# Patient Record
Sex: MALE | Race: WHITE | Employment: UNEMPLOYED | URBAN - METROPOLITAN AREA
[De-identification: names, ages, dates, MRNs, and addresses within clinical notes are randomized per-mention and may not be internally consistent; named-entity substitution may affect disease eponyms.]

---
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
## 2019-03-19 RX ORDER — PERMETHRIN 50 MG/G
CREAM TOPICAL ONCE
Qty: 60 G | Refills: 0 | Status: SHIPPED | OUTPATIENT
Start: 2019-03-19 | End: 2019-03-19

## 2019-05-23 ENCOUNTER — OFFICE VISIT (OUTPATIENT)
Dept: PRIMARY CARE CLINIC | Age: 19
End: 2019-05-23
Payer: COMMERCIAL

## 2019-05-23 VITALS
SYSTOLIC BLOOD PRESSURE: 108 MMHG | BODY MASS INDEX: 24.88 KG/M2 | DIASTOLIC BLOOD PRESSURE: 70 MMHG | HEART RATE: 57 BPM | OXYGEN SATURATION: 98 % | TEMPERATURE: 98.2 F | HEIGHT: 69 IN | WEIGHT: 168 LBS

## 2019-05-23 DIAGNOSIS — Z00.00 ANNUAL PHYSICAL EXAM: Primary | ICD-10-CM

## 2019-05-23 PROCEDURE — 99395 PREV VISIT EST AGE 18-39: CPT | Performed by: FAMILY MEDICINE

## 2019-05-23 ASSESSMENT — PATIENT HEALTH QUESTIONNAIRE - PHQ9
SUM OF ALL RESPONSES TO PHQ QUESTIONS 1-9: 0
1. LITTLE INTEREST OR PLEASURE IN DOING THINGS: 0
2. FEELING DOWN, DEPRESSED OR HOPELESS: 0
SUM OF ALL RESPONSES TO PHQ QUESTIONS 1-9: 0
SUM OF ALL RESPONSES TO PHQ9 QUESTIONS 1 & 2: 0

## 2019-05-23 NOTE — PROGRESS NOTES
SUBJECTIVE:    HPI: Bernadine Franco  Was seen here today for   Chief Complaint   Patient presents with    Well Child    . He states they are dealing with no new issues. Going to [employer] for college for study abroad. Past Medical History:   Diagnosis Date    Sinusitis, acute ethmoidal 11/2014       Past Surgical History:   Procedure Laterality Date    ARM SURGERY Right 09/19/2013    [facility]   Dr Michael Grant       No family history on file. Prior to Admission medications    Not on File       Patient has no known allergies.     Social History     Tobacco Use    Smoking status: Never Smoker    Smokeless tobacco: Never Used   Substance Use Topics    Alcohol use: Not on file         Review Of Systems:    Skin: no abnormal pigmentation, rash, scaling, itching, masses, hair or nail changes  Eyes: no blurring, diplopia, or eye pain  Ears/Nose/Throat: no hearing loss, tinnitus, vertigo, nosebleed, nasal congestion, rhinorrhea, sore throat  Respiratory: no cough, pleuritic chest pain, dyspnea, or wheezing  Cardiovascular: no chest pain, angina, dyspnea on exertion, orthopnea, PND, palpitations, or claudication  Gastrointestinal: no nausea, vomiting, heartburn, diarrhea, constipation, bloating,  abdominal pain, or blood per rectum  Genitourinary: no urinary urgency, frequency, dysuria, nocturia, hesitancy, or incontinence  Musculoskeletal: no arthritis, arthralgia, myalgia, weakness, or morning stiffness  Neurologic: no paralysis, paresis, paresthesia, seizures, tremors, or headaches  Hematologic/Lymphatic/Immunologic: no anemia, abnormal bleeding/bruising, fever, chills, night sweats, swollen glands, or unexplained weight loss  Endocrine: no heat or cold intolerance and no polyphagia, polydipsia, or polyuria      OBJECTIVE:    VS: /70   Pulse 57   Temp 98.2 °F (36.8 °C)   Ht 5' 9\" (1.753 m)   Wt 168 lb (76.2 kg)   SpO2 98%   BMI 24.81 kg/m²   General appearance: Alert, Awake, Oriented times 3, no

## 2019-05-23 NOTE — PATIENT INSTRUCTIONS
Patient Education        Advance Care Planning: Care Instructions  Your Care Instructions    It can be hard to live with an illness that cannot be cured. But if your health is getting worse, you may want to make decisions about end-of-life care. Planning for the end of your life does not mean that you are giving up. It is a way to make sure that your wishes are met. Clearly stating your wishes can make it easier for your loved ones. Making plans while you are still able may also ease your mind and make your final days less stressful and more meaningful. Follow-up care is a key part of your treatment and safety. Be sure to make and go to all appointments, and call your doctor if you are having problems. It's also a good idea to know your test results and keep a list of the medicines you take. What can you do to plan for the end of life? · You can bring these issues up with your doctor. You do not need to wait until your doctor starts the conversation. You might start with \"I would not be willing to live with . Bulgarian Justice Bulgarian Justice Bulgarian Justice \" When you complete this sentence it helps your doctor understand your wishes. · Talk openly and honestly with your doctor. This is the best way to understand the decisions you will need to make as your health changes. Know that you can always change your mind. · Ask your doctor about commonly used life-support measures. These include tube feedings, breathing machines, and fluids given through a vein (IV). Understanding these treatments will help you decide whether you want them. · You may choose to have these life-supporting treatments for a limited time. This allows a trial period to see whether they will help you. You may also decide that you want your doctor to take only certain measures to keep you alive. It is important to spell out these conditions so that your doctor and family understand them. · Talk to your doctor about how long you are likely to live.  He or she may be able to give you an idea of what usually happens with your specific illness. · Think about preparing papers that state your wishes. This way there will not be any confusion about what you want. You can change your instructions at any time. Which papers should you prepare? Advance directives are legal papers that tell doctors how you want to be cared for at the end of your life. You do not need a  to write these papers. Ask your doctor or your state health department for information on how to write your advance directives. They may have the forms for each of these types of papers. Make sure your doctor has a copy of these on file, and give a copy to a family member or close friend. · Consider a do-not-resuscitate order (DNR). This order asks that no extra treatments be done if your heart stops or you stop breathing. Extra treatments may include cardiopulmonary resuscitation (CPR), electrical shock to restart your heart, or a machine to breathe for you. If you decide to have a DNR order, ask your doctor to explain and write it. Place the order in your home where everyone can easily see it. · Consider a living will. A living will explains your wishes about life support and other treatments at the end of your life if you become unable to speak for yourself. Living evans tell doctors to use or not use treatments that would keep you alive. You must have one or two witnesses or a notary present when you sign this form. · Consider a durable power of  for health care. This allows you to name a person to make decisions about your care if you are not able to. Most people ask a close friend or family member. Talk to this person about the kinds of treatments you want and those that you do not want. Make sure this person understands your wishes. These legal papers are simple to change. Tell your doctor what you want to change, and ask him or her to make a note in your medical file.  Give your family updated copies of the papers. Where can you learn more? Go to [email or web address] and sign in to your [employer] account. Enter P184 in the [employer] box to learn more about \"Advance Care Planning: Care Instructions. \"     If you do not have an account, please click on the \"Sign Up Now\" link. Current as of: April 18, 2018  Content Version: 12.0  © 2181-4847 [employer], [employer]. Care instructions adapted under license by [facility] [place] ([facility]). If you have questions about a medical condition or this instruction, always ask your healthcare professional. [address] any warranty or liability for your use of this information.

## 2020-01-03 ENCOUNTER — OFFICE VISIT (OUTPATIENT)
Dept: PRIMARY CARE CLINIC | Age: 20
End: 2020-01-03
Payer: COMMERCIAL

## 2020-01-03 VITALS
DIASTOLIC BLOOD PRESSURE: 76 MMHG | BODY MASS INDEX: 25.03 KG/M2 | WEIGHT: 169 LBS | TEMPERATURE: 97.7 F | OXYGEN SATURATION: 99 % | HEIGHT: 69 IN | SYSTOLIC BLOOD PRESSURE: 118 MMHG | HEART RATE: 63 BPM

## 2020-01-03 PROCEDURE — G8420 CALC BMI NORM PARAMETERS: HCPCS | Performed by: FAMILY MEDICINE

## 2020-01-03 PROCEDURE — 99213 OFFICE O/P EST LOW 20 MIN: CPT | Performed by: FAMILY MEDICINE

## 2020-01-03 PROCEDURE — 1036F TOBACCO NON-USER: CPT | Performed by: FAMILY MEDICINE

## 2020-01-03 PROCEDURE — G8484 FLU IMMUNIZE NO ADMIN: HCPCS | Performed by: FAMILY MEDICINE

## 2020-01-03 PROCEDURE — G8427 DOCREV CUR MEDS BY ELIG CLIN: HCPCS | Performed by: FAMILY MEDICINE

## 2020-01-03 RX ORDER — CLINDAMYCIN PHOSPHATE AND BENZOYL PEROXIDE 10; 37.5 MG/G; MG/G
GEL TOPICAL
COMMUNITY
Start: 2019-11-13

## 2020-01-03 RX ORDER — ADAPALENE 3 MG/G
GEL TOPICAL
COMMUNITY
Start: 2019-11-13

## 2020-01-03 RX ORDER — MINOCYCLINE HYDROCHLORIDE 100 MG/1
CAPSULE ORAL
COMMUNITY
Start: 2020-01-02 | End: 2021-08-02 | Stop reason: ALTCHOICE

## 2020-01-03 ASSESSMENT — PATIENT HEALTH QUESTIONNAIRE - PHQ9
SUM OF ALL RESPONSES TO PHQ9 QUESTIONS 1 & 2: 0
1. LITTLE INTEREST OR PLEASURE IN DOING THINGS: 0
SUM OF ALL RESPONSES TO PHQ QUESTIONS 1-9: 0
SUM OF ALL RESPONSES TO PHQ QUESTIONS 1-9: 0
2. FEELING DOWN, DEPRESSED OR HOPELESS: 0

## 2020-01-03 NOTE — PROGRESS NOTES
James Anthony, a male of 20 y.o. came to the office 1/3/2020. Patient Active Problem List   Diagnosis    Sinusitis, acute ethmoidal          Hand Pain    The incident occurred more than 1 week ago. The incident occurred at the gym. Injury mechanism: lifting wts and doing a snatch motion felt pain go up his thumb into wrist. The pain is present in the left wrist. The pain radiates to the left arm. The pain has been improving since the incident. Pertinent negatives include no muscle weakness, numbness or tingling. The symptoms are aggravated by lifting. He has tried ice, rest, heat and NSAIDs for the symptoms. The treatment provided mild relief. Allergies   Allergen Reactions    Cefdinir     Penicillin G        Current Outpatient Medications on File Prior to Visit   Medication Sig Dispense Refill    Adapalene 0.3 % GEL       minocycline (MINOCIN;DYNACIN) 100 MG capsule       ONEXTON 1.2-3.75 % GEL        No current facility-administered medications on file prior to visit. Review of Systems   Neurological: Negative for tingling and numbness. other review of systems reviewed and are negative    OBJECTIVE:  /76   Pulse 63   Temp 97.7 °F (36.5 °C)   Ht 5' 9\" (1.753 m)   Wt 169 lb (76.7 kg)   SpO2 99%   BMI 24.96 kg/m²      Physical Exam  Left Hand Exam     Tenderness   The patient is experiencing no tenderness. Range of Motion   Wrist   Extension: normal   Flexion: normal   Pronation: normal   Supination: normal     Muscle Strength   The patient has normal left wrist strength. :  5/5     Tests   Finkelstein's test: negative    Other   Erythema: absent            ASSESSMENT AND PLAN:    Maria Esther Aguirre was seen today for hand pain. Diagnoses and all orders for this visit:    Strain of left wrist, initial encounter    - advil prn pain  - HO wrist exercises   - be careful with lifting. Return if symptoms worsen or fail to improve.     Bob Conte, DO

## 2020-03-05 ENCOUNTER — OFFICE VISIT (OUTPATIENT)
Dept: FAMILY MEDICINE CLINIC | Age: 20
End: 2020-03-05
Payer: COMMERCIAL

## 2020-03-05 VITALS
DIASTOLIC BLOOD PRESSURE: 80 MMHG | OXYGEN SATURATION: 98 % | HEIGHT: 69 IN | SYSTOLIC BLOOD PRESSURE: 118 MMHG | BODY MASS INDEX: 23.99 KG/M2 | HEART RATE: 72 BPM | TEMPERATURE: 97.9 F | RESPIRATION RATE: 18 BRPM | WEIGHT: 162 LBS

## 2020-03-05 PROCEDURE — 99213 OFFICE O/P EST LOW 20 MIN: CPT | Performed by: PHYSICIAN ASSISTANT

## 2020-03-05 PROCEDURE — G8420 CALC BMI NORM PARAMETERS: HCPCS | Performed by: PHYSICIAN ASSISTANT

## 2020-03-05 PROCEDURE — G8427 DOCREV CUR MEDS BY ELIG CLIN: HCPCS | Performed by: PHYSICIAN ASSISTANT

## 2020-03-05 PROCEDURE — 1036F TOBACCO NON-USER: CPT | Performed by: PHYSICIAN ASSISTANT

## 2020-03-05 PROCEDURE — G8484 FLU IMMUNIZE NO ADMIN: HCPCS | Performed by: PHYSICIAN ASSISTANT

## 2020-03-05 RX ORDER — FEXOFENADINE HCL AND PSEUDOEPHEDRINE HCI 180; 240 MG/1; MG/1
1 TABLET, EXTENDED RELEASE ORAL DAILY
Qty: 14 TABLET | Refills: 0 | Status: SHIPPED | OUTPATIENT
Start: 2020-03-05

## 2020-03-05 RX ORDER — METHYLPREDNISOLONE 4 MG/1
TABLET ORAL
Qty: 1 KIT | Refills: 0 | Status: SHIPPED
Start: 2020-03-05 | End: 2021-08-02 | Stop reason: ALTCHOICE

## 2020-03-05 RX ORDER — AZITHROMYCIN 250 MG/1
250 TABLET, FILM COATED ORAL SEE ADMIN INSTRUCTIONS
Qty: 6 TABLET | Refills: 0 | Status: SHIPPED | OUTPATIENT
Start: 2020-03-05 | End: 2020-03-10

## 2020-03-05 NOTE — PROGRESS NOTES
3/5/20  Nabeel Roque : 2000 Sex: male  Age 23 y.o. Subjective:  Chief Complaint   Patient presents with    Sinus Problem     pt states sinus pressure in head and sneeking for past week          HPI:   Nabeel Roque , 23 y.o. male presents to express care for evaluation of sinus pressure, headache, sneezing. The patient has had the symptoms for the last week. The patient has been taking some over-the-counter medication without any significant improvement. No chest pain, shortness of breath, abdominal pain. No lightheadedness or dizziness. The patient not currently on any antibiotics. Patient denies any other sick contacts at home. The patient denies travel out of the country. ROS:   Unless otherwise stated in this report the patient's positive and negative responses for review of systems for constitutional, eyes, ENT, cardiovascular, respiratory, gastrointestinal, neurological, , musculoskeletal, and integument systems and related systems to the presenting problem are either stated in the history of present illness or were not pertinent or were negative for the symptoms and/or complaints related to the presenting medical problem. Positives and pertinent negatives as per HPI. All others reviewed and are negative. PMH:     Past Medical History:   Diagnosis Date    Sinusitis, acute ethmoidal 2014       Past Surgical History:   Procedure Laterality Date    ARM SURGERY Right 2013    [facility]   Dr Corrinne Leys       No family history on file. Medications:     Current Outpatient Medications:     Adapalene 0.3 % GEL, , Disp: , Rfl:     minocycline (MINOCIN;DYNACIN) 100 MG capsule, , Disp: , Rfl:     ONEXTON 1.2-3.75 % GEL, , Disp: , Rfl:     Allergies:      Allergies   Allergen Reactions    Cefdinir     Penicillin G        Social History:     Social History     Tobacco Use    Smoking status: Never Smoker    Smokeless tobacco: Never Used   Substance Use Topics  Alcohol use: Not on file    Drug use: Not on file       Patient lives at home. Physical Exam:     Vitals:    03/05/20 1049   BP: 118/80   Pulse: 72   Resp: 18   Temp: 97.9 °F (36.6 °C)   SpO2: 98%   Weight: 162 lb (73.5 kg)   Height: 5' 9\" (1.753 m)       Exam:  Physical Exam  Nurse's notes and vital signs reviewed. The patient is not hypoxic. ? General: Alert, no acute distress, patient resting comfortably Patient is not toxic or lethargic. Skin: Warm, intact, no pallor noted. There is no evidence of rash at this time. Head: Normocephalic, atraumatic  Eye: Normal conjunctiva  Ears, Nose, Throat: Right tympanic membrane clear, left tympanic membrane clear. No drainage or discharge noted. No pre- or post-auricular tenderness, erythema, or swelling noted. Nasal congestion, rhinorrhea, no epistaxis quite a bit of drainage from the left nares  Posterior oropharynx shows erythema and cobblestoning tonsillar hypertrophy, or exudate. the uvula is midline. No trismus or drooling is noted. Moist mucous membranes. Neck: No anterior/posterior lymphadenopathy noted. No erythema, no masses, no fluctuance or induration noted. No meningeal signs. Cardiovascular: Regular Rate and Rhythm  Respiratory: No acute distress, no rhonchi, wheezing or crackles noted. No stridor or retractions are noted. Neurological: A&O x4, normal speech  Psychiatric: Cooperative         Testing:           Medical Decision Making:     The patient has been seen and evaluated. The vital signs have been reviewed. The patient does not appear to be toxic or lethargic. The patient will be treated with a azithromycin, Allegra-D, and Medrol Dosepak. The patient was educated on the proper dosage of motrin and tylenol and the appropriate intervals of each. The patient is to increase fluid intake over the next several days. The patient is to use OTC nasal spray.     The patient is to return to express care or go directly to the emergency department should any of the signs or symptoms worsen. The patient is to followup with primary care physician in 2-3 days for repeat evaluation. The patient has no other questions or concerns at this time the patient will be discharged home. Clinical Impression:   Ren Guerrero was seen today for sinus problem. Diagnoses and all orders for this visit:    Acute non-recurrent sinusitis, unspecified location    Postnasal drip    Sinus headache    Other orders  -     azithromycin (ZITHROMAX) 250 MG tablet; Take 1 tablet by mouth See Admin Instructions for 5 days 500mg on day 1 followed by 250mg on days 2 - 5  -     methylPREDNISolone (MEDROL DOSEPACK) 4 MG tablet; Take by mouth. -     fexofenadine-pseudoephedrine (ALLEGRA-D 24HR) 180-240 MG per extended release tablet; Take 1 tablet by mouth daily        The patient is to call for any concerns or return if any of the signs or symptoms worsen. The patient is to follow-up with PCP in the next 2-3 days for repeat evaluation repeat assessment or go directly to the emergency department.      SIGNATURE: Carlos A Amador III, PA-C

## 2021-08-02 ENCOUNTER — OFFICE VISIT (OUTPATIENT)
Dept: PRIMARY CARE CLINIC | Age: 21
End: 2021-08-02
Payer: COMMERCIAL

## 2021-08-02 VITALS
BODY MASS INDEX: 26.07 KG/M2 | WEIGHT: 176 LBS | OXYGEN SATURATION: 96 % | HEART RATE: 76 BPM | DIASTOLIC BLOOD PRESSURE: 82 MMHG | HEIGHT: 69 IN | TEMPERATURE: 97.2 F | SYSTOLIC BLOOD PRESSURE: 124 MMHG

## 2021-08-02 DIAGNOSIS — Z00.00 ANNUAL PHYSICAL EXAM: Primary | ICD-10-CM

## 2021-08-02 PROCEDURE — 99395 PREV VISIT EST AGE 18-39: CPT | Performed by: FAMILY MEDICINE

## 2021-08-02 SDOH — ECONOMIC STABILITY: FOOD INSECURITY: WITHIN THE PAST 12 MONTHS, THE FOOD YOU BOUGHT JUST DIDN'T LAST AND YOU DIDN'T HAVE MONEY TO GET MORE.: NEVER TRUE

## 2021-08-02 SDOH — ECONOMIC STABILITY: FOOD INSECURITY: WITHIN THE PAST 12 MONTHS, YOU WORRIED THAT YOUR FOOD WOULD RUN OUT BEFORE YOU GOT MONEY TO BUY MORE.: NEVER TRUE

## 2021-08-02 ASSESSMENT — PATIENT HEALTH QUESTIONNAIRE - PHQ9
SUM OF ALL RESPONSES TO PHQ QUESTIONS 1-9: 0
1. LITTLE INTEREST OR PLEASURE IN DOING THINGS: 0
SUM OF ALL RESPONSES TO PHQ QUESTIONS 1-9: 0
2. FEELING DOWN, DEPRESSED OR HOPELESS: 0
SUM OF ALL RESPONSES TO PHQ QUESTIONS 1-9: 0
SUM OF ALL RESPONSES TO PHQ9 QUESTIONS 1 & 2: 0

## 2021-08-02 ASSESSMENT — SOCIAL DETERMINANTS OF HEALTH (SDOH): HOW HARD IS IT FOR YOU TO PAY FOR THE VERY BASICS LIKE FOOD, HOUSING, MEDICAL CARE, AND HEATING?: NOT HARD AT ALL

## 2021-08-02 NOTE — PROGRESS NOTES
SUBJECTIVE:    HPI: Joseph Rhoades  Was seen here today for   Chief Complaint   Patient presents with   [employer] Annual Exam    .  He states they are dealing with no new issues. 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 Past Medical History:   Diagnosis Date    Sinusitis, acute ethmoidal 11/2014       Past Surgical History:   Procedure Laterality Date    ARM SURGERY Right 09/19/2013    [facility]   Dr Kristin Camacho       History reviewed. No pertinent family history. Prior to Admission medications    Medication Sig Start Date End Date Taking?  Authorizing Provider   fexofenadine-pseudoephedrine (ALLEGRA-D 24HR) 180-240 MG per extended release tablet Take 1 tablet by mouth daily 3/5/20  Yes YUSEF Stringer III   Adapalene 0.3 % GEL  11/13/19  Yes Historical Provider, MD   ONEXTON 1.2-3.75 % GEL  11/13/19  Yes Historical Provider, MD       Cefdinir and Penicillin g    Social History     Tobacco Use    Smoking status: Never Smoker    Smokeless tobacco: Never Used   Substance Use Topics    Alcohol use: Not on file         Review Of Systems:    Skin: no abnormal pigmentation, rash, scaling, itching, masses, hair or nail changes  Eyes: no blurring, diplopia, or eye pain  Ears/Nose/Throat: no hearing loss, tinnitus, vertigo, nosebleed, nasal congestion, rhinorrhea, sore throat  Respiratory: no cough, pleuritic chest pain, dyspnea, or wheezing  Cardiovascular: no chest pain, angina, dyspnea on exertion, orthopnea, PND, palpitations, or claudication  Gastrointestinal: no nausea, vomiting, heartburn, diarrhea, constipation, bloating,  abdominal pain, or blood per rectum  Genitourinary: no urinary urgency, frequency, dysuria, nocturia, hesitancy, or incontinence  Musculoskeletal: no arthritis, arthralgia, myalgia, weakness, or morning stiffness  Neurologic: no paralysis, paresis, paresthesia, seizures, tremors, or headaches  Hematologic/Lymphatic/Immunologic: no anemia, abnormal bleeding/bruising, fever, chills, night sweats, swollen glands, or unexplained weight loss  Endocrine: no heat or cold intolerance and no polyphagia, polydipsia, or polyuria      OBJECTIVE:    VS: /82   Pulse 76   Temp 97.2 °F (36.2 °C)   Ht 5' 9\" (1.753 m)   Wt 176 lb (79.8 kg)   SpO2 96%   BMI 25.99 kg/m²   General appearance: Alert, Awake, Oriented times 3, no distress  Skin: Warm and dry  Head: Normocephalic. No masses, lesions or tenderness noted  Eyes: Conjunctivae appear normal. PERRL  Ears: External ears normal, TM's clear and intact  Nose/Sinuses: Nares normal. Septum midline. Mucosa normal. No drainage  Oropharynx: Oropharynx clear with no exudate seen  Neck: Neck supple. No jugular venous distension, lymphadenopathy or thyromegaly Trachea midline. No carotid bruits  Lungs: Lungs clear to auscultation bilaterally. No rhonchi, crackles or wheezes  Heart: S1 S2  Regular rate and rhythm. No rub, murmur or gallop  Abdomen: Abdomen soft, non-tender. BS normal. No masses, organomegaly  Extremities: No edema, Peripheral pulses palpable  Musculoskeletal: Muscular strength appears intact. No joint effusion, tenderness, swelling or warmth  Neuro:  No focal motor or sensory deficits. 2+/4 L4 reflexes        ASSESSMENT/PLAN:  Kristopher Ramey was seen today for annual exam.    Diagnoses and all orders for this visit:    Annual physical exam     - refuses labs at present. Return for as needed, or for acute problem.     Everardo Conte,

## 2023-06-12 DIAGNOSIS — Z00.00 ANNUAL PHYSICAL EXAM: ICD-10-CM

## 2023-06-12 LAB
ERYTHROCYTE [DISTWIDTH] IN BLOOD BY AUTOMATED COUNT: 12.9 FL (ref 11.5–15)
HCT VFR BLD AUTO: 50.7 % (ref 37–54)
HGB BLD-MCNC: 16.8 G/DL (ref 12.5–16.5)
MCH RBC QN AUTO: 30.6 PG (ref 26–35)
MCHC RBC AUTO-ENTMCNC: 33.1 % (ref 32–34.5)
MCV RBC AUTO: 92.3 FL (ref 80–99.9)
PLATELET # BLD AUTO: 194 E9/L (ref 130–450)
PMV BLD AUTO: 10.9 FL (ref 7–12)
RBC # BLD AUTO: 5.49 E12/L (ref 3.8–5.8)
WBC # BLD: 8.2 E9/L (ref 4.5–11.5)

## 2023-06-13 LAB
ALBUMIN SERPL-MCNC: 4.8 G/DL (ref 3.5–5.2)
ALP SERPL-CCNC: 55 U/L (ref 40–129)
ALT SERPL-CCNC: 39 U/L (ref 0–40)
ANION GAP SERPL CALCULATED.3IONS-SCNC: 15 MMOL/L (ref 7–16)
AST SERPL-CCNC: 30 U/L (ref 0–39)
BILIRUB SERPL-MCNC: 0.5 MG/DL (ref 0–1.2)
BUN SERPL-MCNC: 13 MG/DL (ref 6–20)
CALCIUM SERPL-MCNC: 10 MG/DL (ref 8.6–10.2)
CHLORIDE SERPL-SCNC: 102 MMOL/L (ref 98–107)
CHOLESTEROL, TOTAL: 154 MG/DL (ref 0–199)
CO2 SERPL-SCNC: 24 MMOL/L (ref 22–29)
CREAT SERPL-MCNC: 1 MG/DL (ref 0.7–1.2)
GLUCOSE SERPL-MCNC: 78 MG/DL (ref 74–99)
HDLC SERPL-MCNC: 47 MG/DL
LDLC SERPL CALC-MCNC: 74 MG/DL (ref 0–99)
POTASSIUM SERPL-SCNC: 4.3 MMOL/L (ref 3.5–5)
PROT SERPL-MCNC: 7.6 G/DL (ref 6.4–8.3)
SODIUM SERPL-SCNC: 141 MMOL/L (ref 132–146)
TRIGL SERPL-MCNC: 163 MG/DL (ref 0–149)
VLDLC SERPL CALC-MCNC: 33 MG/DL

## 2023-10-13 ENCOUNTER — TELEPHONE (OUTPATIENT)
Dept: PRIMARY CARE CLINIC | Age: 23
End: 2023-10-13

## 2023-10-13 NOTE — TELEPHONE ENCOUNTER
----- Message from Miquel Patten sent at 10/13/2023  8:56 AM EDT -----  Subject: Appointment Request    Reason for Call: Established Patient Appointment needed: Routine Physical   Exam    QUESTIONS    Reason for appointment request? No appointments available during search     Additional Information for Provider? patient is going to be leaving for   [place] and needs a physical by 11/10/2023 if possible, please contact   patient to advise.   ---------------------------------------------------------------------------  --------------  Zahraa Boyd INFO  [phone]; OK to leave message on voicemail  ---------------------------------------------------------------------------  --------------  SCRIPT ANSWERS

## 2023-10-13 NOTE — TELEPHONE ENCOUNTER
Lm for pt to call back to schedule, if paperwork is needing filled out he was seen in June for a phy so doctor should be able to fill that out.

## 2023-10-18 ENCOUNTER — OFFICE VISIT (OUTPATIENT)
Dept: PRIMARY CARE CLINIC | Age: 23
End: 2023-10-18
Payer: COMMERCIAL

## 2023-10-18 VITALS
BODY MASS INDEX: 25.84 KG/M2 | TEMPERATURE: 97.2 F | WEIGHT: 175 LBS | OXYGEN SATURATION: 98 % | SYSTOLIC BLOOD PRESSURE: 122 MMHG | HEART RATE: 72 BPM | DIASTOLIC BLOOD PRESSURE: 80 MMHG

## 2023-10-18 DIAGNOSIS — T36.0X5A ADVERSE EFFECT OF PENICILLIN, INITIAL ENCOUNTER: Primary | ICD-10-CM

## 2023-10-18 PROCEDURE — G8427 DOCREV CUR MEDS BY ELIG CLIN: HCPCS | Performed by: FAMILY MEDICINE

## 2023-10-18 PROCEDURE — G8419 CALC BMI OUT NRM PARAM NOF/U: HCPCS | Performed by: FAMILY MEDICINE

## 2023-10-18 PROCEDURE — 1036F TOBACCO NON-USER: CPT | Performed by: FAMILY MEDICINE

## 2023-10-18 PROCEDURE — G8484 FLU IMMUNIZE NO ADMIN: HCPCS | Performed by: FAMILY MEDICINE

## 2023-10-18 PROCEDURE — 99212 OFFICE O/P EST SF 10 MIN: CPT | Performed by: FAMILY MEDICINE

## 2023-10-18 RX ORDER — AMOXICILLIN 500 MG/1
500 CAPSULE ORAL 3 TIMES DAILY
Qty: 15 CAPSULE | Refills: 0 | Status: SHIPPED | OUTPATIENT
Start: 2023-10-18 | End: 2023-10-23

## 2023-10-30 ENCOUNTER — TELEPHONE (OUTPATIENT)
Dept: PRIMARY CARE CLINIC | Age: 23
End: 2023-10-30

## 2023-10-30 NOTE — TELEPHONE ENCOUNTER
I will fill out paperwork stating that he has not had a reaction to the amoxicillin. He can pick this paperwork up tomorrow.

## 2023-10-30 NOTE — TELEPHONE ENCOUNTER
Pt called and wanted to give an update on medication he was told to call about in 5 days. Pt states he has no reaction to medication. Please advise if there is any other advice for pt.

## 2023-12-15 ENCOUNTER — OFFICE VISIT (OUTPATIENT)
Dept: PRIMARY CARE CLINIC | Age: 23
End: 2023-12-15
Payer: COMMERCIAL

## 2023-12-15 VITALS
BODY MASS INDEX: 26.81 KG/M2 | SYSTOLIC BLOOD PRESSURE: 122 MMHG | HEIGHT: 69 IN | DIASTOLIC BLOOD PRESSURE: 84 MMHG | OXYGEN SATURATION: 98 % | HEART RATE: 66 BPM | TEMPERATURE: 98 F | WEIGHT: 181 LBS | RESPIRATION RATE: 16 BRPM

## 2023-12-15 DIAGNOSIS — R20.2 NUMBNESS AND TINGLING OF FOOT: ICD-10-CM

## 2023-12-15 DIAGNOSIS — R20.0 NUMBNESS AND TINGLING OF FOOT: ICD-10-CM

## 2023-12-15 DIAGNOSIS — M25.562 ACUTE PAIN OF LEFT KNEE: Primary | ICD-10-CM

## 2023-12-15 PROCEDURE — 99213 OFFICE O/P EST LOW 20 MIN: CPT | Performed by: FAMILY MEDICINE

## 2023-12-15 PROCEDURE — 1036F TOBACCO NON-USER: CPT | Performed by: FAMILY MEDICINE

## 2023-12-15 PROCEDURE — G8484 FLU IMMUNIZE NO ADMIN: HCPCS | Performed by: FAMILY MEDICINE

## 2023-12-15 PROCEDURE — G8419 CALC BMI OUT NRM PARAM NOF/U: HCPCS | Performed by: FAMILY MEDICINE

## 2023-12-15 PROCEDURE — G8427 DOCREV CUR MEDS BY ELIG CLIN: HCPCS | Performed by: FAMILY MEDICINE

## 2024-01-08 DIAGNOSIS — Z01.84 IMMUNITY STATUS TESTING: Primary | ICD-10-CM

## 2024-01-08 NOTE — PROGRESS NOTES
Called patient left message that I ordered test at the [facility] wants for him to have done including HIV, hepatitis B surface antibody, hepatitis B surface antigen, hepatitis C antibody, RPR for syphilis, and QuantiFERON-TB to rule out TB.  Lab appointment only needed.

## 2024-01-23 ENCOUNTER — TELEPHONE (OUTPATIENT)
Dept: PRIMARY CARE CLINIC | Age: 24
End: 2024-01-23

## 2024-01-23 DIAGNOSIS — Z00.00 ANNUAL PHYSICAL EXAM: Primary | ICD-10-CM

## 2024-01-23 NOTE — TELEPHONE ENCOUNTER
Pt called with a question scheduling tests, 't-score' results, mentioned [facility] [place]. If you could call pt at earliest convenience at [phone].

## 2024-03-09 ENCOUNTER — HOSPITAL ENCOUNTER (OUTPATIENT)
Age: 24
Discharge: HOME | End: 2024-03-09
Payer: COMMERCIAL

## 2024-03-09 DIAGNOSIS — Z01.84 IMMUNITY STATUS TESTING: ICD-10-CM

## 2024-03-09 DIAGNOSIS — Z00.00 ANNUAL PHYSICAL EXAM: ICD-10-CM

## 2024-03-09 LAB
ALBUMIN SERPL-MCNC: 5 G/DL (ref 3.5–5.2)
ALP SERPL-CCNC: 57 U/L (ref 40–129)
ALT SERPL-CCNC: 19 U/L (ref 0–40)
ANION GAP SERPL CALCULATED.3IONS-SCNC: 12 MMOL/L (ref 7–16)
AST SERPL-CCNC: 18 U/L (ref 0–39)
BILIRUB SERPL-MCNC: 0.7 MG/DL (ref 0–1.2)
BUN SERPL-MCNC: 14 MG/DL (ref 6–20)
CALCIUM SERPL-MCNC: 10 MG/DL (ref 8.6–10.2)
CHLORIDE SERPL-SCNC: 101 MMOL/L (ref 98–107)
CO2 SERPL-SCNC: 27 MMOL/L (ref 22–29)
CREAT SERPL-MCNC: 1 MG/DL (ref 0.7–1.2)
ERYTHROCYTE [DISTWIDTH] IN BLOOD BY AUTOMATED COUNT: 12.4 % (ref 11.5–15)
GFR SERPL CREATININE-BSD FRML MDRD: >60 ML/MIN/1.73M2
GLUCOSE SERPL-MCNC: 84 MG/DL (ref 74–99)
HCT VFR BLD AUTO: 50.5 % (ref 37–54)
HGB BLD-MCNC: 16.9 G/DL (ref 12.5–16.5)
MCH RBC QN AUTO: 30.3 PG (ref 26–35)
MCHC RBC AUTO-ENTMCNC: 33.5 G/DL (ref 32–34.5)
MCV RBC AUTO: 90.7 FL (ref 80–99.9)
PLATELET # BLD AUTO: 256 K/UL (ref 130–450)
PMV BLD AUTO: 9.4 FL (ref 7–12)
POTASSIUM SERPL-SCNC: 4 MMOL/L (ref 3.5–5)
PROT SERPL-MCNC: 8.3 G/DL (ref 6.4–8.3)
RBC # BLD AUTO: 5.57 M/UL (ref 3.8–5.8)
SODIUM SERPL-SCNC: 140 MMOL/L (ref 132–146)
WBC OTHER # BLD: 6.7 K/UL (ref 4.5–11.5)

## 2024-03-09 PROCEDURE — 86803 HEPATITIS C AB TEST: CPT

## 2024-03-09 PROCEDURE — 87340 HEPATITIS B SURFACE AG IA: CPT

## 2024-03-09 PROCEDURE — 36415 COLL VENOUS BLD VENIPUNCTURE: CPT

## 2024-03-09 PROCEDURE — 87389 HIV-1 AG W/HIV-1&-2 AB AG IA: CPT

## 2024-03-09 PROCEDURE — 86592 SYPHILIS TEST NON-TREP QUAL: CPT

## 2024-03-09 PROCEDURE — 86317 IMMUNOASSAY INFECTIOUS AGENT: CPT

## 2024-03-09 PROCEDURE — 80053 COMPREHEN METABOLIC PANEL: CPT

## 2024-03-09 PROCEDURE — 85027 COMPLETE CBC AUTOMATED: CPT

## 2024-03-11 LAB — RPR SER QL: NONREACTIVE

## 2024-03-12 LAB
HBV SURFACE AB SERPL IA-ACNC: 93.12 MIU/ML (ref 0–9.99)
HBV SURFACE AG SERPL QL IA: NONREACTIVE
HCV AB SERPL QL IA: NONREACTIVE
HIV 1+2 AB+HIV1 P24 AG SERPL QL IA: NONREACTIVE

## 2024-03-13 ENCOUNTER — HOSPITAL ENCOUNTER (OUTPATIENT)
Age: 24
Discharge: HOME | End: 2024-03-13
Payer: COMMERCIAL

## 2024-03-13 DIAGNOSIS — Z01.84 IMMUNITY STATUS TESTING: ICD-10-CM

## 2024-03-13 PROCEDURE — 36415 COLL VENOUS BLD VENIPUNCTURE: CPT

## 2024-03-13 PROCEDURE — 86481 TB AG RESPONSE T-CELL SUSP: CPT

## 2024-03-16 LAB — T SPOT TB TEST: NORMAL

## 2024-03-24 ASSESSMENT — PATIENT HEALTH QUESTIONNAIRE - PHQ9
2. FEELING DOWN, DEPRESSED OR HOPELESS: NOT AT ALL
SUM OF ALL RESPONSES TO PHQ QUESTIONS 1-9: 0
SUM OF ALL RESPONSES TO PHQ QUESTIONS 1-9: 0
2. FEELING DOWN, DEPRESSED OR HOPELESS: NOT AT ALL
1. LITTLE INTEREST OR PLEASURE IN DOING THINGS: NOT AT ALL
SUM OF ALL RESPONSES TO PHQ9 QUESTIONS 1 & 2: 0
1. LITTLE INTEREST OR PLEASURE IN DOING THINGS: NOT AT ALL
SUM OF ALL RESPONSES TO PHQ9 QUESTIONS 1 & 2: 0
SUM OF ALL RESPONSES TO PHQ QUESTIONS 1-9: 0
SUM OF ALL RESPONSES TO PHQ QUESTIONS 1-9: 0

## 2024-03-25 ENCOUNTER — OFFICE VISIT (OUTPATIENT)
Dept: PRIMARY CARE CLINIC | Age: 24
End: 2024-03-25
Payer: COMMERCIAL

## 2024-03-25 VITALS
DIASTOLIC BLOOD PRESSURE: 70 MMHG | SYSTOLIC BLOOD PRESSURE: 116 MMHG | BODY MASS INDEX: 26.36 KG/M2 | TEMPERATURE: 98.6 F | HEIGHT: 69 IN | WEIGHT: 178 LBS | OXYGEN SATURATION: 98 % | HEART RATE: 72 BPM

## 2024-03-25 DIAGNOSIS — D75.839 THROMBOCYTOSIS: ICD-10-CM

## 2024-03-25 DIAGNOSIS — E78.1 PURE HYPERTRIGLYCERIDEMIA: Primary | ICD-10-CM

## 2024-03-25 DIAGNOSIS — Z88.1 ALLERGY TO CEPHALOSPORIN: ICD-10-CM

## 2024-03-25 PROCEDURE — G8419 CALC BMI OUT NRM PARAM NOF/U: HCPCS | Performed by: FAMILY MEDICINE

## 2024-03-25 PROCEDURE — 1036F TOBACCO NON-USER: CPT | Performed by: FAMILY MEDICINE

## 2024-03-25 PROCEDURE — G8484 FLU IMMUNIZE NO ADMIN: HCPCS | Performed by: FAMILY MEDICINE

## 2024-03-25 PROCEDURE — G8427 DOCREV CUR MEDS BY ELIG CLIN: HCPCS | Performed by: FAMILY MEDICINE

## 2024-03-25 PROCEDURE — 99213 OFFICE O/P EST LOW 20 MIN: CPT | Performed by: FAMILY MEDICINE

## 2024-03-25 RX ORDER — CLINDAMYCIN HYDROCHLORIDE 300 MG/1
CAPSULE ORAL
COMMUNITY
Start: 2024-03-12

## 2024-03-25 RX ORDER — HYDROCODONE BITARTRATE AND ACETAMINOPHEN 5; 325 MG/1; MG/1
1 TABLET ORAL EVERY 6 HOURS PRN
COMMUNITY
Start: 2024-03-21

## 2024-03-25 RX ORDER — METHYLPREDNISOLONE 4 MG/1
TABLET ORAL
COMMUNITY
Start: 2024-03-12

## 2024-03-25 RX ORDER — IBUPROFEN 800 MG/1
TABLET ORAL
COMMUNITY
Start: 2024-03-12

## 2024-03-25 RX ORDER — CEFDINIR 300 MG/1
300 CAPSULE ORAL 2 TIMES DAILY
Qty: 2 CAPSULE | Refills: 0 | Status: SHIPPED | OUTPATIENT
Start: 2024-03-25 | End: 2024-03-26

## 2024-03-25 ASSESSMENT — ENCOUNTER SYMPTOMS
COUGH: 0
SHORTNESS OF BREATH: 0

## 2024-03-25 NOTE — PROGRESS NOTES
Jeremiah Zaragoza, a male of 24 y.o. came to the office 3/25/2024.     Patient Active Problem List   Diagnosis   (none) - all problems resolved or deleted          HPI allergy: will view patient taking Cefdinir 300 mg x 1 dose to rule out cephalosporin allergy.  Hyperlipidemia: slightly elevated tg's   Heme: slightly elevated Hb but actually in normal range of 14- 18 for men.     Allergies   Allergen Reactions    Cefdinir     Penicillin G Hives       Current Outpatient Medications on File Prior to Visit   Medication Sig Dispense Refill    clindamycin (CLEOCIN) 300 MG capsule       HYDROcodone-acetaminophen (NORCO) 5-325 MG per tablet Take 1 tablet by mouth every 6 hours as needed for Pain.      ibuprofen (ADVIL;MOTRIN) 800 MG tablet       methylPREDNISolone (MEDROL DOSEPACK) 4 MG tablet        No current facility-administered medications on file prior to visit.       Review of Systems   Constitutional:  Negative for chills and fever.   Respiratory:  Negative for cough and shortness of breath.    Cardiovascular:  Negative for chest pain and palpitations.   Skin:  Negative for rash.     other review of systems reviewed and are negative    OBJECTIVE:  /70   Pulse 72   Temp 98.6 °F (37 °C)   Ht 1.753 m (5' 9\")   Wt 80.7 kg (178 lb)   SpO2 98%   BMI 26.29 kg/m²      Physical Exam  Vitals reviewed.   Eyes:      General: No scleral icterus.     Conjunctiva/sclera: Conjunctivae normal.   Neck:      Thyroid: No thyromegaly.      Vascular: No carotid bruit.   Cardiovascular:      Rate and Rhythm: Normal rate and regular rhythm.      Heart sounds: No murmur heard.  Pulmonary:      Effort: Pulmonary effort is normal.      Breath sounds: Normal breath sounds. No wheezing or rales.   Abdominal:      General: Bowel sounds are normal.      Palpations: Abdomen is soft. There is no mass.      Tenderness: There is no abdominal tenderness. There is no guarding or rebound.   Musculoskeletal:         General: Normal range

## 2024-03-30 ENCOUNTER — HOSPITAL ENCOUNTER (OUTPATIENT)
Age: 24
Discharge: HOME | End: 2024-03-30
Payer: COMMERCIAL

## 2024-03-30 DIAGNOSIS — D75.839 THROMBOCYTOSIS: ICD-10-CM

## 2024-03-30 DIAGNOSIS — E78.1 PURE HYPERTRIGLYCERIDEMIA: ICD-10-CM

## 2024-03-30 LAB
BASOPHILS # BLD: 0.04 K/UL (ref 0–0.2)
BASOPHILS NFR BLD: 1 % (ref 0–2)
CHOLEST SERPL-MCNC: 152 MG/DL
EOSINOPHIL # BLD: 0.25 K/UL (ref 0.05–0.5)
EOSINOPHILS RELATIVE PERCENT: 4 % (ref 0–6)
ERYTHROCYTE [DISTWIDTH] IN BLOOD BY AUTOMATED COUNT: 12.5 % (ref 11.5–15)
HCT VFR BLD AUTO: 50.9 % (ref 37–54)
HDLC SERPL-MCNC: 48 MG/DL
HGB BLD-MCNC: 16.8 G/DL (ref 12.5–16.5)
IMM GRANULOCYTES # BLD AUTO: <0.03 K/UL (ref 0–0.58)
IMM GRANULOCYTES NFR BLD: 0 % (ref 0–5)
LDLC SERPL CALC-MCNC: 84 MG/DL
LYMPHOCYTES NFR BLD: 2.38 K/UL (ref 1.5–4)
LYMPHOCYTES RELATIVE PERCENT: 36 % (ref 20–42)
MCH RBC QN AUTO: 30.3 PG (ref 26–35)
MCHC RBC AUTO-ENTMCNC: 33 G/DL (ref 32–34.5)
MCV RBC AUTO: 91.7 FL (ref 80–99.9)
MONOCYTES NFR BLD: 0.43 K/UL (ref 0.1–0.95)
MONOCYTES NFR BLD: 7 % (ref 2–12)
NEUTROPHILS NFR BLD: 53 % (ref 43–80)
NEUTS SEG NFR BLD: 3.51 K/UL (ref 1.8–7.3)
PLATELET # BLD AUTO: 207 K/UL (ref 130–450)
PMV BLD AUTO: 9.8 FL (ref 7–12)
RBC # BLD AUTO: 5.55 M/UL (ref 3.8–5.8)
TRIGL SERPL-MCNC: 98 MG/DL
VLDLC SERPL CALC-MCNC: 20 MG/DL
WBC OTHER # BLD: 6.6 K/UL (ref 4.5–11.5)

## 2024-03-30 PROCEDURE — 80061 LIPID PANEL: CPT

## 2024-03-30 PROCEDURE — 85025 COMPLETE CBC W/AUTO DIFF WBC: CPT

## 2024-03-30 PROCEDURE — 36415 COLL VENOUS BLD VENIPUNCTURE: CPT

## 2024-08-01 ENCOUNTER — OFFICE VISIT (OUTPATIENT)
Dept: PRIMARY CARE CLINIC | Age: 24
End: 2024-08-01
Payer: COMMERCIAL

## 2024-08-01 VITALS
TEMPERATURE: 97.9 F | HEIGHT: 69 IN | RESPIRATION RATE: 18 BRPM | SYSTOLIC BLOOD PRESSURE: 116 MMHG | OXYGEN SATURATION: 97 % | WEIGHT: 180 LBS | DIASTOLIC BLOOD PRESSURE: 72 MMHG | BODY MASS INDEX: 26.66 KG/M2 | HEART RATE: 95 BPM

## 2024-08-01 DIAGNOSIS — S90.822A FRICTION BLISTERS OF SOLE OF LEFT FOOT, INITIAL ENCOUNTER: ICD-10-CM

## 2024-08-01 DIAGNOSIS — R53.83 OTHER FATIGUE: ICD-10-CM

## 2024-08-01 DIAGNOSIS — S99.929A INJURY OF GREAT TOENAIL: Primary | ICD-10-CM

## 2024-08-01 PROCEDURE — G8419 CALC BMI OUT NRM PARAM NOF/U: HCPCS | Performed by: FAMILY MEDICINE

## 2024-08-01 PROCEDURE — G8427 DOCREV CUR MEDS BY ELIG CLIN: HCPCS | Performed by: FAMILY MEDICINE

## 2024-08-01 PROCEDURE — 1036F TOBACCO NON-USER: CPT | Performed by: FAMILY MEDICINE

## 2024-08-01 PROCEDURE — 99213 OFFICE O/P EST LOW 20 MIN: CPT | Performed by: FAMILY MEDICINE

## 2024-08-01 RX ORDER — SODIUM FLUORIDE AND POTASSIUM NITRATE 5.8; 57.5 MG/ML; MG/ML
GEL, DENTIFRICE DENTAL
COMMUNITY
Start: 2024-05-22

## 2024-08-01 RX ORDER — TRIAMCINOLONE ACETONIDE 1 MG/G
CREAM TOPICAL
Qty: 15 G | Refills: 0 | Status: SHIPPED | OUTPATIENT
Start: 2024-08-01

## 2024-08-01 SDOH — ECONOMIC STABILITY: HOUSING INSECURITY
IN THE LAST 12 MONTHS, WAS THERE A TIME WHEN YOU DID NOT HAVE A STEADY PLACE TO SLEEP OR SLEPT IN A SHELTER (INCLUDING NOW)?: NO

## 2024-08-01 SDOH — ECONOMIC STABILITY: FOOD INSECURITY: WITHIN THE PAST 12 MONTHS, THE FOOD YOU BOUGHT JUST DIDN'T LAST AND YOU DIDN'T HAVE MONEY TO GET MORE.: NEVER TRUE

## 2024-08-01 SDOH — ECONOMIC STABILITY: FOOD INSECURITY: WITHIN THE PAST 12 MONTHS, YOU WORRIED THAT YOUR FOOD WOULD RUN OUT BEFORE YOU GOT MONEY TO BUY MORE.: NEVER TRUE

## 2024-08-01 SDOH — ECONOMIC STABILITY: INCOME INSECURITY: HOW HARD IS IT FOR YOU TO PAY FOR THE VERY BASICS LIKE FOOD, HOUSING, MEDICAL CARE, AND HEATING?: NOT HARD AT ALL

## 2024-08-01 NOTE — PROGRESS NOTES
Jeremiah Zaragoza, a male of 24 y.o. came to the office 8/1/2024.     Patient Active Problem List   Diagnosis   (none) - all problems resolved or deleted          Rash  This is a new problem. The affected locations include the left foot. The rash is characterized by blistering. Associated symptoms include fatigue. Pertinent negatives include no fever.   Fatigue  This is a new problem. The current episode started in the past 7 days. The problem has been gradually improving. Associated symptoms include fatigue, numbness (left arm 4 days ago improved when ate and drank fluids.) and a rash. Pertinent negatives include no chills or fever. Exacerbated by: recent travel to [place].    toenail : R first dropped something on toe, now abnormal toe nail.  2 wks ago began with on bottom of left foot with lesions. Was recently in [place], [place] doing a lot of activities.     Allergies   Allergen Reactions    Cefdinir Hives    Penicillin G Other (See Comments)     Unsure just using precaution noted patient        Current Outpatient Medications on File Prior to Visit   Medication Sig Dispense Refill    PREVIDENT 5000 SENSITIVE 1.1-5 % GEL        No current facility-administered medications on file prior to visit.       Review of Systems   Constitutional:  Positive for fatigue. Negative for chills and fever.   Skin:  Positive for rash.   Neurological:  Positive for numbness (left arm 4 days ago improved when ate and drank fluids.).     other review of systems reviewed and are negative    OBJECTIVE:  /72 (Site: Left Upper Arm, Position: Sitting)   Pulse 95   Temp 97.9 °F (36.6 °C)   Resp 18   Ht 1.753 m (5' 9.02\")   Wt 81.6 kg (180 lb)   SpO2 97%   BMI 26.57 kg/m²      Physical Exam  Constitutional:       General: He is not in acute distress.  Eyes:      General: No scleral icterus.     Conjunctiva/sclera: Conjunctivae normal.   Neck:      Thyroid: No thyromegaly.   Cardiovascular:      Rate and Rhythm: Normal rate and

## 2025-02-11 ENCOUNTER — OFFICE VISIT (OUTPATIENT)
Dept: PRIMARY CARE CLINIC | Age: 25
End: 2025-02-11
Payer: COMMERCIAL

## 2025-02-11 VITALS
WEIGHT: 182 LBS | TEMPERATURE: 97.5 F | DIASTOLIC BLOOD PRESSURE: 80 MMHG | HEART RATE: 85 BPM | BODY MASS INDEX: 26.86 KG/M2 | SYSTOLIC BLOOD PRESSURE: 120 MMHG | OXYGEN SATURATION: 97 %

## 2025-02-11 DIAGNOSIS — S46.912A STRAIN OF LEFT SHOULDER, INITIAL ENCOUNTER: Primary | ICD-10-CM

## 2025-02-11 PROCEDURE — 1036F TOBACCO NON-USER: CPT | Performed by: FAMILY MEDICINE

## 2025-02-11 PROCEDURE — 99213 OFFICE O/P EST LOW 20 MIN: CPT | Performed by: FAMILY MEDICINE

## 2025-02-11 PROCEDURE — G8427 DOCREV CUR MEDS BY ELIG CLIN: HCPCS | Performed by: FAMILY MEDICINE

## 2025-02-11 PROCEDURE — G8419 CALC BMI OUT NRM PARAM NOF/U: HCPCS | Performed by: FAMILY MEDICINE

## 2025-02-11 SDOH — ECONOMIC STABILITY: FOOD INSECURITY: WITHIN THE PAST 12 MONTHS, YOU WORRIED THAT YOUR FOOD WOULD RUN OUT BEFORE YOU GOT MONEY TO BUY MORE.: NEVER TRUE

## 2025-02-11 SDOH — ECONOMIC STABILITY: FOOD INSECURITY: WITHIN THE PAST 12 MONTHS, THE FOOD YOU BOUGHT JUST DIDN'T LAST AND YOU DIDN'T HAVE MONEY TO GET MORE.: NEVER TRUE

## 2025-02-11 ASSESSMENT — PATIENT HEALTH QUESTIONNAIRE - PHQ9
1. LITTLE INTEREST OR PLEASURE IN DOING THINGS: NOT AT ALL
SUM OF ALL RESPONSES TO PHQ QUESTIONS 1-9: 0
SUM OF ALL RESPONSES TO PHQ9 QUESTIONS 1 & 2: 0
2. FEELING DOWN, DEPRESSED OR HOPELESS: NOT AT ALL
SUM OF ALL RESPONSES TO PHQ QUESTIONS 1-9: 0

## 2025-02-11 NOTE — PROGRESS NOTES
Jeremiah Zaragoza, a male of 24 y.o. came to the office 2/11/2025.     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 The pain is present in the upper left arm, left forearm and left shoulder. The quality of the pain is described as aching. Associated symptoms include muscle weakness (LUE) and tingling (left hand). Exacerbated by: over use. He has tried acetaminophen for the symptoms. The treatment provided mild relief.   Leg Pain   There was no injury mechanism. The pain is present in the left knee. Associated symptoms include muscle weakness (LUE) and tingling (left hand). Exacerbated by: standing on concrete alot at work.        Allergies   Allergen Reactions    Cefdinir Hives    Penicillin G Other (See Comments)     Unsure just using precaution noted patient        Current Outpatient Medications on File Prior to Visit   Medication Sig Dispense Refill    PREVIDENT 5000 SENSITIVE 1.1-5 % GEL       triamcinolone (KENALOG) 0.1 % cream Apply topically 2 times daily. 15 g 0     No current facility-administered medications on file prior to visit.       Review of Systems   Musculoskeletal:         See hpi.   Neurological:  Positive for tingling (left hand).     other review of systems reviewed and are negative    OBJECTIVE:  /80   Pulse 85   Temp 97.5 °F (36.4 °C)   Wt 82.6 kg (182 lb)   SpO2 97%   BMI 26.86 kg/m²      Physical Exam  Constitutional:       General: He is not in acute distress.     Appearance: Normal appearance. He is not ill-appearing, toxic-appearing or diaphoretic.   HENT:      Head: Normocephalic.   Eyes:      General: No scleral icterus.  Pulmonary:      Effort: Pulmonary effort is normal.   Musculoskeletal:      Right shoulder: Normal range of motion. Normal strength.      Left shoulder: Normal range of motion. Normal